# Patient Record
Sex: FEMALE | Race: BLACK OR AFRICAN AMERICAN | NOT HISPANIC OR LATINO | URBAN - METROPOLITAN AREA
[De-identification: names, ages, dates, MRNs, and addresses within clinical notes are randomized per-mention and may not be internally consistent; named-entity substitution may affect disease eponyms.]

---

## 2018-10-27 ENCOUNTER — EMERGENCY (EMERGENCY)
Facility: HOSPITAL | Age: 63
LOS: 1 days | Discharge: HOME | End: 2018-10-27
Attending: EMERGENCY MEDICINE | Admitting: EMERGENCY MEDICINE
Payer: MEDICARE

## 2018-10-27 VITALS
HEART RATE: 67 BPM | OXYGEN SATURATION: 98 % | SYSTOLIC BLOOD PRESSURE: 117 MMHG | RESPIRATION RATE: 18 BRPM | DIASTOLIC BLOOD PRESSURE: 75 MMHG | TEMPERATURE: 98 F

## 2018-10-27 VITALS
DIASTOLIC BLOOD PRESSURE: 90 MMHG | RESPIRATION RATE: 18 BRPM | SYSTOLIC BLOOD PRESSURE: 155 MMHG | HEART RATE: 98 BPM | TEMPERATURE: 98 F | WEIGHT: 215.83 LBS | OXYGEN SATURATION: 100 %

## 2018-10-27 PROCEDURE — 99283 EMERGENCY DEPT VISIT LOW MDM: CPT

## 2018-10-27 RX ORDER — TRIAMTERENE 100 MG/1
0 CAPSULE ORAL
Qty: 0 | Refills: 0 | COMMUNITY

## 2018-10-27 RX ORDER — AMLODIPINE BESYLATE 2.5 MG/1
1 TABLET ORAL
Qty: 0 | Refills: 0 | COMMUNITY

## 2018-10-27 RX ORDER — FAMOTIDINE 10 MG/ML
0 INJECTION INTRAVENOUS
Qty: 0 | Refills: 0 | COMMUNITY

## 2018-10-27 RX ORDER — COLESEVELAM HYDROCHLORIDE 625 MG/1
0 TABLET, FILM COATED ORAL
Qty: 0 | Refills: 0 | COMMUNITY

## 2018-10-27 RX ORDER — PHENYLEPHRINE HCL 2.5 %
1 DROPS OPHTHALMIC (EYE) ONCE
Qty: 0 | Refills: 0 | Status: COMPLETED | OUTPATIENT
Start: 2018-10-27 | End: 2018-10-27

## 2018-10-27 RX ORDER — TROPICAMIDE 1 %
1 DROPS OPHTHALMIC (EYE) ONCE
Qty: 0 | Refills: 0 | Status: COMPLETED | OUTPATIENT
Start: 2018-10-27 | End: 2018-10-27

## 2018-10-27 RX ORDER — LEVOTHYROXINE SODIUM 125 MCG
0 TABLET ORAL
Qty: 0 | Refills: 0 | COMMUNITY

## 2018-10-27 RX ADMIN — Medication 1 DROP(S): at 13:55

## 2018-10-27 RX ADMIN — Medication 1 DROP(S): at 13:50

## 2018-10-27 NOTE — ED ADULT NURSE NOTE - OBJECTIVE STATEMENT
Pt came to ED complaining of spider web patter in right eye since yesterday. Reports "lace curtain like" pattern to right eye. Pt denies any pain, redness, discharge, pressure, blackout vision. Eyes are PERRL.  PT denies D/N/V, chest pain, SOB, /GI symptoms.  PT is alert and oriented x3, ambulatory with even gait.

## 2018-10-27 NOTE — ED ADULT NURSE NOTE - CHPI ED NUR SYMPTOMS NEG
no double vision/no drainage/no eye lid swelling/no itching/no purulent drainage/no pain/no photophobia/no foreign body/no discharge

## 2018-10-27 NOTE — ED PROVIDER NOTE - EYES, MLM
Clear bilaterally, pupils equal, round and reactive to light. eomi, no conjunctival injection visual acuity: R 20/50, L 20/15, BL 20/20. IOP R eye is 13, L eye is 18

## 2018-10-27 NOTE — ED ADULT TRIAGE NOTE - CHIEF COMPLAINT QUOTE
" I might have a retinal detachment to my right eye, I am seeing a black spider web since yesterday".

## 2018-10-27 NOTE — ED PROVIDER NOTE - PROGRESS NOTE DETAILS
appreciate ophtho consult by dr. maria pt with posterior vitreous hemorrhage, pt has f/u with ophtho in [place] on tues, given strict vision return precautions. appreciate ophtho consult by dr. maria pt with posterior vitreous hemorrhage, pt has f/u with ophtho in [place] on tues, given strict vision return precautions. note scanned in to chart

## 2018-10-27 NOTE — ED PROVIDER NOTE - MEDICAL DECISION MAKING DETAILS
63F with R eye floater, vision change. hx of glaucoma, no eye pain, no eye redness, mild decreased visual acuity, concern for pvd vs retinal detachment, appreciate ophtho consult by dr maria. Pt w/o retinal detachment, however pt has e/o hemorrhagic vitreous detachment. Has ophtho f/u on Tuesday at home. Given strict vision return precautions. No ha, nl cn, pt otherwise well appearing will dc home.

## 2018-10-27 NOTE — ED PROVIDER NOTE - OBJECTIVE STATEMENT
63F with concern for R eye vision changes. Pt with hx of HTN, glaucoma, takes Latanoprost eye drops presenting with R eye spider web sensation obscuring vision started yesterday, pt noticed flashes of light prior to symptoms. No curtain-like sensation, no eye redness/trauma/pain. Pt wears contacts at baseline -3.5 bl. No narrowing of vision.

## 2018-10-27 NOTE — ED ADULT NURSE NOTE - NSIMPLEMENTINTERV_GEN_ALL_ED
Implemented All Universal Safety Interventions:  [place] to call system. Call bell, personal items and telephone within reach. Instruct patient to call for assistance. Room bathroom lighting operational. Non-slip footwear when patient is off stretcher. Physically safe environment: no spills, clutter or unnecessary equipment. Stretcher in lowest position, wheels locked, appropriate side rails in place.

## 2018-10-31 DIAGNOSIS — H43.811 VITREOUS DEGENERATION, RIGHT EYE: ICD-10-CM

## 2018-10-31 DIAGNOSIS — Z79.899 OTHER LONG TERM (CURRENT) DRUG THERAPY: ICD-10-CM

## 2018-10-31 DIAGNOSIS — H53.8 OTHER VISUAL DISTURBANCES: ICD-10-CM

## 2018-10-31 DIAGNOSIS — I10 ESSENTIAL (PRIMARY) HYPERTENSION: ICD-10-CM

## 2018-10-31 DIAGNOSIS — E78.00 PURE HYPERCHOLESTEROLEMIA, UNSPECIFIED: ICD-10-CM

## 2018-10-31 DIAGNOSIS — E03.9 HYPOTHYROIDISM, UNSPECIFIED: ICD-10-CM

## 2020-10-08 NOTE — ED ADULT TRIAGE NOTE - NS ED TRIAGE CLINICAL UPGRADE PROVIDER FT
[facility]   Cardiac Electrophysiology Consultation   Date: 10/8/2020  Admit Date:  10/7/2020  Reason for Consultation: Afib w/ RVR  Consult Requesting Physician: Cori Lara MD     Chief Complaint   Patient presents with    Dizziness     had syncopal episode on Sept. 30, today was plunging out sink when arms and shoulders felt really sore, then a minute after started to feel dizzy and lightheaded     HPI: Vickey Padgett is a 74 y.o.  hypothyroidism, peripheral neuropathy and chronic pain syndrome on medical marijuana, who presented to the ED with lightheadedness. Pt reported that on 9/30, she had an episode of syncope lasting 10 seconds, no prodromal symptoms, no post-ictal symptoms. Pt was putting some pans away felt lightheaded, sat down, and lost consciousness for 10 sec. Similar episode happened  yesterday which brought her to the ED, she was plunging in bathroom and then started feeling lightheaded. She does reported occasional fatigue, lightheadedness and palpitations for the past few months. Denied cp, orthopnea, pnd, ZHOU. Pt smokes marijuana everyday for the past 30 years. In ED; hr was 140s was dx w/ afib and started on metop. Past Medical History:   Diagnosis Date    Breast cancer ([facility] [address] 2000    Neuropathy     Thyroid disease         Past Surgical History:   Procedure Laterality Date    TONSILLECTOMY       Social History:  Reviewed. reports that she has never smoked. She has never used smokeless tobacco. She reports previous alcohol use. She reports current drug use. Drug: Marijuana. Family History:  Reviewed. family history is not on file. No premature CAD. Review of System:  All other systems reviewed except for that noted above.  Pertinent negatives and positives are:     Objective      · General: negative for fever, chills   · Ophthalmic ROS: negative for - eye pain or loss of vision  · ENT ROS: negative for - headaches, sore throat · Respiratory: negative for - cough, sputum  · Cardiovascular: Reviewed in HPI  · Gastrointestinal: negative for - abdominal pain, diarrhea, N/V  · Hematology: negative for - bleeding, blood clots, bruising or jaundice  · Genito-Urinary:  negative for - Dysuria or incontinence  · Musculoskeletal: negative for - Joint swelling, muscle pain  · Neurological: negative for - confusion, dizziness, headaches   · Psychiatric: No anxiety, no depression. · Dermatological: negative for - rash    Physical Examination:  Vitals:    10/08/20 1150   BP: 101/68   Pulse: 88   Resp: 16   Temp: 98.3 °F (36.8 °C)   SpO2: 97%        Intake/Output Summary (Last 24 hours) at 10/8/2020 1349  Last data filed at 10/8/2020 0900  Gross per 24 hour   Intake 360 ml   Output --   Net 360 ml     In: 360 [P.O.:360]  Out: -    Wt Readings from Last 3 Encounters:   10/07/20 144 lb 6.4 oz (65.5 kg)     Temp  Av.8 °F (36.6 °C)  Min: 97.1 °F (36.2 °C)  Max: 98.6 °F (37 °C)  Pulse  Av.6  Min: 60  Max: 120  BP  Min: 99/73  Max: 141/95  SpO2  Av.2 %  Min: 93 %  Max: 100 %    · Telemetry: MAT  · Constitutional: Alert. Oriented to person, place, and time. No distress. · Head: Normocephalic and atraumatic. · Mouth/Throat: Lips appear moist. Oropharynx is clear and moist.  · Eyes: Conjunctivae normal. EOM are normal.   · Neck: Neck supple. No lymphadenopathy. No rigidity. No JVD present. · Cardiovascular: Normal rate, regular rhythm. Normal S1&S2. Carotid pulse 2+ bilaterally. · Pulmonary/Chest: Bilateral respiratory sounds present. No respiratory accessory muscle use. No wheezes, No rhonchi. · Abdominal: Soft. Normal bowel sounds present. No distension, No tenderness. No splenomegaly. No hernia. · Musculoskeletal: No tenderness. No edema    · Lymphadenopathy: Has no cervical adenopathy. · Neurological: Alert and oriented. Cranial nerve II-XII grossly intact, No gross deficit to touch. · Skin: Skin is warm and dry.  No rash, lesions, ulcerations noted. · Psychiatric: No anxiety nor agitation. Labs:  Reviewed. Recent Labs     10/07/20  1332      K 4.0      CO2 26   BUN 17   CREATININE 0.7     Recent Labs     10/07/20  1332 10/08/20  0610   WBC 7.9 7.5   HGB 15.5 14.8   HCT 46.2 44.1   MCV 92.3 92.0    251     Lab Results   Component Value Date    TROPONINI <0.01 10/08/2020     No results found for: BNP  Lab Results   Component Value Date    PROTIME 12.0 10/07/2020    INR 1.03 10/07/2020     Lab Results   Component Value Date    CHOL 187 10/08/2020    HDL 53 10/08/2020    TRIG 157 10/08/2020       Diagnostic and imaging results reviewed. ECG: MAT  Echo: pending  Cath: n/a    I independently reviewed the ECG and telemetry. Scheduled Meds:   metoprolol tartrate  25 mg Oral BID    gabapentin  600 mg Oral BID    levothyroxine  125 mcg Oral Daily    sodium chloride flush  10 mL Intravenous 2 times per day    enoxaparin  1 mg/kg Subcutaneous BID     Continuous Infusions:  PRN Meds:.metoprolol, sodium chloride flush, acetaminophen **OR** acetaminophen, polyethylene glycol, promethazine **OR** ondansetron, perflutren lipid microspheres     Assessment:   Patient Active Problem List    Diagnosis Date Noted    Atrial fibrillation with rapid ventricular response ([address] 10/07/2020      Active Hospital Problems    Diagnosis Date Noted    Atrial fibrillation with rapid ventricular response ([address] [I48.91] 10/07/2020         Recommendation (s):    Atrial Fibrillation: [id number] Score - 3   - Currently on metop 25mg BID, inc to 50 BID for better rate control  - Eliquis  - ECHO pending; will decide on further rx depending on echo results    Merideth Frankel, MD  PGY3    Dicussed with Ozzy Weston MD    Thank you for allowing me to participate in the care of [facility]. . If you have any questions/comments, please do not hesitate to contact us.       Attestation  I have seen,interviewed and examined the patient with the medical resident. Pertinent medical data, EKG, telemetry and echocardiogram were personally reviewed by me. Refer to the residents note for details of clinical findings. I agree with his assessment and plan with following addendum:    Symptomatic Afib - fatigue  ? Syncope  Eliquis 5 mg po BID  Metoprolol 50 mg bid for rate control  Pending TTE  TSH - WNL  Further recommendations based on TTE  Monitor in [address] [employer] [facility] [phone]            For any EP related issues after 5 PM, contact [facility] on call cardiology through . linda